# Patient Record
Sex: FEMALE | Race: BLACK OR AFRICAN AMERICAN | NOT HISPANIC OR LATINO | Employment: OTHER | URBAN - METROPOLITAN AREA
[De-identification: names, ages, dates, MRNs, and addresses within clinical notes are randomized per-mention and may not be internally consistent; named-entity substitution may affect disease eponyms.]

---

## 2021-01-14 ENCOUNTER — NEW REFERRAL (OUTPATIENT)
Dept: URBAN - METROPOLITAN AREA CLINIC 33 | Facility: CLINIC | Age: 66
End: 2021-01-14

## 2021-01-14 VITALS
SYSTOLIC BLOOD PRESSURE: 161 MMHG | BODY MASS INDEX: 29.71 KG/M2 | HEART RATE: 70 BPM | WEIGHT: 174 LBS | DIASTOLIC BLOOD PRESSURE: 85 MMHG | HEIGHT: 64 IN

## 2021-01-14 DIAGNOSIS — E11.3593: ICD-10-CM

## 2021-01-14 DIAGNOSIS — H25.13: ICD-10-CM

## 2021-01-14 DIAGNOSIS — H43.393: ICD-10-CM

## 2021-01-14 PROCEDURE — 99204 OFFICE O/P NEW MOD 45 MIN: CPT

## 2021-01-14 PROCEDURE — 92235 FLUORESCEIN ANGRPH MLTIFRAME: CPT

## 2021-01-14 PROCEDURE — 67028 INJECTION EYE DRUG: CPT

## 2021-01-14 PROCEDURE — 92250 FUNDUS PHOTOGRAPHY W/I&R: CPT

## 2021-01-14 PROCEDURE — 92134 CPTRZ OPH DX IMG PST SGM RTA: CPT

## 2021-01-14 ASSESSMENT — TONOMETRY
OD_IOP_MMHG: 14
OS_IOP_MMHG: 17

## 2021-01-14 ASSESSMENT — VISUAL ACUITY
OS_SC: 20/40-1
OD_SC: 20/30-1

## 2021-01-29 ENCOUNTER — CLINICAL PROCEDURE AND DIAGNOSTIC TESTING ONLY (OUTPATIENT)
Dept: URBAN - METROPOLITAN AREA CLINIC 26 | Facility: CLINIC | Age: 66
End: 2021-01-29

## 2021-01-29 DIAGNOSIS — E11.3593: ICD-10-CM

## 2021-01-29 PROCEDURE — 92250 FUNDUS PHOTOGRAPHY W/I&R: CPT

## 2021-01-29 PROCEDURE — 67028 INJECTION EYE DRUG: CPT

## 2021-01-29 ASSESSMENT — TONOMETRY
OS_IOP_MMHG: 14
OD_IOP_MMHG: 14

## 2021-01-29 ASSESSMENT — VISUAL ACUITY
OD_SC: 20/40+1
OS_SC: 20/25-2

## 2021-02-19 ENCOUNTER — CLINICAL PROCEDURE AND DIAGNOSTIC TESTING ONLY (OUTPATIENT)
Dept: URBAN - METROPOLITAN AREA CLINIC 26 | Facility: CLINIC | Age: 66
End: 2021-02-19

## 2021-02-19 DIAGNOSIS — E11.3593: ICD-10-CM

## 2021-02-19 PROCEDURE — 67028 INJECTION EYE DRUG: CPT

## 2021-02-19 PROCEDURE — 92250 FUNDUS PHOTOGRAPHY W/I&R: CPT

## 2021-02-19 ASSESSMENT — TONOMETRY
OD_IOP_MMHG: 17
OS_IOP_MMHG: 18

## 2021-02-19 ASSESSMENT — VISUAL ACUITY
OS_SC: 20/30-2
OD_SC: 20/40

## 2021-03-05 ENCOUNTER — CLINIC PROCEDURE ONLY (OUTPATIENT)
Dept: URBAN - METROPOLITAN AREA CLINIC 26 | Facility: CLINIC | Age: 66
End: 2021-03-05

## 2021-03-05 DIAGNOSIS — E11.3593: ICD-10-CM

## 2021-03-05 PROCEDURE — 67028 INJECTION EYE DRUG: CPT

## 2021-03-05 ASSESSMENT — VISUAL ACUITY
OD_SC: 20/40-2
OS_SC: 20/30-2

## 2021-03-05 ASSESSMENT — TONOMETRY
OS_IOP_MMHG: 15
OD_IOP_MMHG: 17

## 2021-04-02 ENCOUNTER — CLINICAL PROCEDURE AND DIAGNOSTIC TESTING ONLY (OUTPATIENT)
Dept: URBAN - METROPOLITAN AREA CLINIC 26 | Facility: CLINIC | Age: 66
End: 2021-04-02

## 2021-04-02 DIAGNOSIS — E11.3593: ICD-10-CM

## 2021-04-02 PROCEDURE — 67028 INJECTION EYE DRUG: CPT

## 2021-04-02 PROCEDURE — 92250 FUNDUS PHOTOGRAPHY W/I&R: CPT

## 2021-04-02 ASSESSMENT — VISUAL ACUITY
OS_SC: 20/25+2
OD_SC: 20/40+1

## 2021-04-02 ASSESSMENT — TONOMETRY
OD_IOP_MMHG: 11
OS_IOP_MMHG: 13

## 2021-04-16 ENCOUNTER — CLINICAL PROCEDURE AND DIAGNOSTIC TESTING ONLY (OUTPATIENT)
Dept: URBAN - METROPOLITAN AREA CLINIC 26 | Facility: CLINIC | Age: 66
End: 2021-04-16

## 2021-04-16 DIAGNOSIS — H43.393: ICD-10-CM

## 2021-04-16 DIAGNOSIS — H35.373: ICD-10-CM

## 2021-04-16 DIAGNOSIS — E11.3593: ICD-10-CM

## 2021-04-16 PROCEDURE — 67028 INJECTION EYE DRUG: CPT

## 2021-04-16 PROCEDURE — 92250 FUNDUS PHOTOGRAPHY W/I&R: CPT

## 2021-04-16 PROCEDURE — 92134 CPTRZ OPH DX IMG PST SGM RTA: CPT

## 2021-04-16 PROCEDURE — 92014 COMPRE OPH EXAM EST PT 1/>: CPT

## 2021-04-16 ASSESSMENT — VISUAL ACUITY
OS_SC: 20/20-1
OD_SC: 20/30-2

## 2021-04-16 ASSESSMENT — TONOMETRY
OS_IOP_MMHG: 15
OD_IOP_MMHG: 14

## 2021-06-16 ENCOUNTER — FOLLOW UP AND POST INJECTION EVALUATION (OUTPATIENT)
Dept: URBAN - METROPOLITAN AREA CLINIC 26 | Facility: CLINIC | Age: 66
End: 2021-06-16

## 2021-06-16 VITALS — DIASTOLIC BLOOD PRESSURE: 77 MMHG | HEIGHT: 55 IN | SYSTOLIC BLOOD PRESSURE: 110 MMHG | HEART RATE: 65 BPM

## 2021-06-16 DIAGNOSIS — E11.3593: ICD-10-CM

## 2021-06-16 DIAGNOSIS — H35.373: ICD-10-CM

## 2021-06-16 DIAGNOSIS — H25.13: ICD-10-CM

## 2021-06-16 DIAGNOSIS — H43.393: ICD-10-CM

## 2021-06-16 PROCEDURE — 67028 INJECTION EYE DRUG: CPT

## 2021-06-16 PROCEDURE — 92235 FLUORESCEIN ANGRPH MLTIFRAME: CPT

## 2021-06-16 PROCEDURE — 92250 FUNDUS PHOTOGRAPHY W/I&R: CPT

## 2021-06-16 PROCEDURE — 92134 CPTRZ OPH DX IMG PST SGM RTA: CPT

## 2021-06-16 PROCEDURE — 92014 COMPRE OPH EXAM EST PT 1/>: CPT

## 2021-06-16 ASSESSMENT — TONOMETRY
OD_IOP_MMHG: 13
OS_IOP_MMHG: 11

## 2021-06-16 ASSESSMENT — VISUAL ACUITY
OD_SC: 20/30-1
OS_SC: 20/20-1

## 2021-08-18 ENCOUNTER — FOLLOW UP AND POST INJECTION EVALUATION (OUTPATIENT)
Dept: URBAN - METROPOLITAN AREA CLINIC 26 | Facility: CLINIC | Age: 66
End: 2021-08-18

## 2021-08-18 DIAGNOSIS — H35.373: ICD-10-CM

## 2021-08-18 DIAGNOSIS — H04.123: ICD-10-CM

## 2021-08-18 DIAGNOSIS — H25.13: ICD-10-CM

## 2021-08-18 DIAGNOSIS — E11.3593: ICD-10-CM

## 2021-08-18 DIAGNOSIS — H43.393: ICD-10-CM

## 2021-08-18 PROCEDURE — 67028 INJECTION EYE DRUG: CPT

## 2021-08-18 PROCEDURE — 92250 FUNDUS PHOTOGRAPHY W/I&R: CPT

## 2021-08-18 PROCEDURE — 92134 CPTRZ OPH DX IMG PST SGM RTA: CPT

## 2021-08-18 PROCEDURE — 92014 COMPRE OPH EXAM EST PT 1/>: CPT

## 2021-08-18 ASSESSMENT — VISUAL ACUITY
OS_CC: 20/20
OD_CC: 20/30

## 2021-08-18 ASSESSMENT — TONOMETRY
OS_IOP_MMHG: 11
OD_IOP_MMHG: 10
OS_IOP_MMHG: 29

## 2021-10-20 ENCOUNTER — FOLLOW UP AND POST INJECTION EVALUATION (OUTPATIENT)
Dept: URBAN - METROPOLITAN AREA CLINIC 26 | Facility: CLINIC | Age: 66
End: 2021-10-20

## 2021-10-20 DIAGNOSIS — E11.3593: ICD-10-CM

## 2021-10-20 DIAGNOSIS — H43.393: ICD-10-CM

## 2021-10-20 DIAGNOSIS — H04.123: ICD-10-CM

## 2021-10-20 DIAGNOSIS — H35.373: ICD-10-CM

## 2021-10-20 PROCEDURE — 67028 INJECTION EYE DRUG: CPT

## 2021-10-20 PROCEDURE — 92134 CPTRZ OPH DX IMG PST SGM RTA: CPT

## 2021-10-20 PROCEDURE — 92012 INTRM OPH EXAM EST PATIENT: CPT

## 2021-10-20 PROCEDURE — 92250 FUNDUS PHOTOGRAPHY W/I&R: CPT

## 2021-10-20 ASSESSMENT — VISUAL ACUITY
OS_CC: 20/30+2
OD_CC: 20/50+2

## 2021-10-20 ASSESSMENT — TONOMETRY
OS_IOP_MMHG: 16
OD_IOP_MMHG: 14

## 2021-12-20 ENCOUNTER — CLINIC PROCEDURE ONLY (OUTPATIENT)
Dept: URBAN - METROPOLITAN AREA CLINIC 26 | Facility: CLINIC | Age: 66
End: 2021-12-20

## 2021-12-20 DIAGNOSIS — H35.373: ICD-10-CM

## 2021-12-20 DIAGNOSIS — E11.3593: ICD-10-CM

## 2021-12-20 PROCEDURE — 92250 FUNDUS PHOTOGRAPHY W/I&R: CPT

## 2021-12-20 PROCEDURE — 92134 CPTRZ OPH DX IMG PST SGM RTA: CPT

## 2021-12-20 PROCEDURE — 67028 INJECTION EYE DRUG: CPT

## 2021-12-20 ASSESSMENT — TONOMETRY
OD_IOP_MMHG: 13
OS_IOP_MMHG: 15

## 2021-12-20 ASSESSMENT — VISUAL ACUITY
OD_PH: 20/25-2
OD_SC: 20/50-2
OS_SC: 20/30
OS_PH: 20/20-2

## 2021-12-21 ENCOUNTER — OFFICE VISIT (OUTPATIENT)
Dept: URBAN - METROPOLITAN AREA CLINIC 63 | Facility: CLINIC | Age: 66
End: 2021-12-21

## 2022-01-12 NOTE — PATIENT DISCUSSION
SIGNIFICANT BROW PTOSIS, OU; RECOMMEND DIRECT BROW, OU.  I have examined the patient and reviewed the photos and visual fields.  The brow position has fallen below the orbital rim.  This causes impairment of the peripheral visual field which improves with taping.  I have discussed with the patient the option of a brow lift to elevate the brow to a more normal anatomic position and improve the functional visual field loss.  We have discussed the risks and benefits of the surgery at length as well as the location of the incision and the recovery process.  The patient understands the surgery , has had all questions addressed and desires to proceed with the surgery as explained.

## 2022-01-12 NOTE — PATIENT DISCUSSION
RECOMMEND COSMETIC LOWER EYELID BLEPHAROPLASTY W/ CO2 LASER RESURFACING. The patient is considering repair of their lower eyelid dermatochalasis.  Discussed risks and benefits of a transconjunctival lower eyelid blepharoplasty with laser resurfacing. The patient is aware of risk of prolonged redness from laser resurfacing, which may last up to 6 months in normal cases.  The patient was referred to the website for further information. The patient understands that this portion is cosmetic and will schedule surgery at their convenience.

## 2022-03-16 ENCOUNTER — FOLLOW UP (OUTPATIENT)
Dept: URBAN - METROPOLITAN AREA CLINIC 26 | Facility: CLINIC | Age: 67
End: 2022-03-16

## 2022-03-16 VITALS
HEIGHT: 63 IN | SYSTOLIC BLOOD PRESSURE: 199 MMHG | HEART RATE: 63 BPM | DIASTOLIC BLOOD PRESSURE: 91 MMHG | WEIGHT: 193 LBS | BODY MASS INDEX: 34.2 KG/M2

## 2022-03-16 DIAGNOSIS — E11.3511: ICD-10-CM

## 2022-03-16 DIAGNOSIS — H35.373: ICD-10-CM

## 2022-03-16 DIAGNOSIS — H43.393: ICD-10-CM

## 2022-03-16 DIAGNOSIS — E11.3592: ICD-10-CM

## 2022-03-16 DIAGNOSIS — H04.123: ICD-10-CM

## 2022-03-16 PROCEDURE — 92012 INTRM OPH EXAM EST PATIENT: CPT

## 2022-03-16 PROCEDURE — 92134 CPTRZ OPH DX IMG PST SGM RTA: CPT

## 2022-03-16 ASSESSMENT — TONOMETRY
OD_IOP_MMHG: 15
OS_IOP_MMHG: 13

## 2022-03-16 ASSESSMENT — VISUAL ACUITY
OS_SC: 20/25
OD_SC: 20/40-2
OD_PH: 20/30

## 2022-05-03 ENCOUNTER — FOLLOW UP (OUTPATIENT)
Dept: URBAN - METROPOLITAN AREA CLINIC 26 | Facility: CLINIC | Age: 67
End: 2022-05-03

## 2022-05-03 VITALS
HEART RATE: 61 BPM | BODY MASS INDEX: 32.6 KG/M2 | DIASTOLIC BLOOD PRESSURE: 94 MMHG | WEIGHT: 184 LBS | SYSTOLIC BLOOD PRESSURE: 209 MMHG | HEIGHT: 63 IN

## 2022-05-03 DIAGNOSIS — H35.373: ICD-10-CM

## 2022-05-03 DIAGNOSIS — E11.3592: ICD-10-CM

## 2022-05-03 DIAGNOSIS — H04.123: ICD-10-CM

## 2022-05-03 DIAGNOSIS — E11.3511: ICD-10-CM

## 2022-05-03 DIAGNOSIS — H43.393: ICD-10-CM

## 2022-05-03 PROCEDURE — 92134 CPTRZ OPH DX IMG PST SGM RTA: CPT

## 2022-05-03 PROCEDURE — 92012 INTRM OPH EXAM EST PATIENT: CPT

## 2022-05-03 PROCEDURE — 92235 FLUORESCEIN ANGRPH MLTIFRAME: CPT

## 2022-05-03 ASSESSMENT — VISUAL ACUITY
OD_SC: 20/30-1
OS_SC: 20/25-1

## 2022-05-03 ASSESSMENT — TONOMETRY
OS_IOP_MMHG: 19
OD_IOP_MMHG: 17

## 2022-07-09 ENCOUNTER — TELEPHONE ENCOUNTER (OUTPATIENT)
Dept: URBAN - METROPOLITAN AREA CLINIC 121 | Facility: CLINIC | Age: 67
End: 2022-07-09

## 2022-07-10 ENCOUNTER — TELEPHONE ENCOUNTER (OUTPATIENT)
Dept: URBAN - METROPOLITAN AREA CLINIC 121 | Facility: CLINIC | Age: 67
End: 2022-07-10

## 2022-08-10 ENCOUNTER — FOLLOW UP (OUTPATIENT)
Dept: URBAN - METROPOLITAN AREA CLINIC 26 | Facility: CLINIC | Age: 67
End: 2022-08-10

## 2022-08-10 DIAGNOSIS — H35.373: ICD-10-CM

## 2022-08-10 DIAGNOSIS — E11.3511: ICD-10-CM

## 2022-08-10 DIAGNOSIS — H43.393: ICD-10-CM

## 2022-08-10 DIAGNOSIS — E11.3592: ICD-10-CM

## 2022-08-10 DIAGNOSIS — H04.123: ICD-10-CM

## 2022-08-10 PROCEDURE — 92014 COMPRE OPH EXAM EST PT 1/>: CPT

## 2022-08-10 PROCEDURE — 92134 CPTRZ OPH DX IMG PST SGM RTA: CPT

## 2022-08-10 PROCEDURE — 92250 FUNDUS PHOTOGRAPHY W/I&R: CPT

## 2022-08-10 ASSESSMENT — TONOMETRY
OD_IOP_MMHG: 21
OS_IOP_MMHG: 21

## 2022-08-10 ASSESSMENT — VISUAL ACUITY
OS_SC: 20/30-1
OD_SC: 20/50+2
OD_PH: 20/30-1

## 2023-06-15 ENCOUNTER — FOLLOW UP (OUTPATIENT)
Dept: URBAN - METROPOLITAN AREA CLINIC 26 | Facility: CLINIC | Age: 68
End: 2023-06-15

## 2023-06-15 VITALS
SYSTOLIC BLOOD PRESSURE: 137 MMHG | HEART RATE: 73 BPM | BODY MASS INDEX: 33.77 KG/M2 | WEIGHT: 193 LBS | HEIGHT: 63.5 IN | DIASTOLIC BLOOD PRESSURE: 76 MMHG

## 2023-06-15 DIAGNOSIS — H43.393: ICD-10-CM

## 2023-06-15 DIAGNOSIS — H04.123: ICD-10-CM

## 2023-06-15 DIAGNOSIS — E11.3592: ICD-10-CM

## 2023-06-15 DIAGNOSIS — H35.373: ICD-10-CM

## 2023-06-15 DIAGNOSIS — E11.3511: ICD-10-CM

## 2023-06-15 PROCEDURE — 92134 CPTRZ OPH DX IMG PST SGM RTA: CPT

## 2023-06-15 PROCEDURE — 92250 FUNDUS PHOTOGRAPHY W/I&R: CPT

## 2023-06-15 PROCEDURE — 92235 FLUORESCEIN ANGRPH MLTIFRAME: CPT

## 2023-06-15 PROCEDURE — 92014 COMPRE OPH EXAM EST PT 1/>: CPT

## 2023-06-15 ASSESSMENT — VISUAL ACUITY
OS_SC: 20/25-1
OD_PH: 20/40-2
OD_SC: 20/50

## 2023-06-15 ASSESSMENT — TONOMETRY
OS_IOP_MMHG: 14
OD_IOP_MMHG: 11

## 2023-08-09 ENCOUNTER — FOLLOW UP (OUTPATIENT)
Dept: URBAN - METROPOLITAN AREA CLINIC 26 | Facility: CLINIC | Age: 68
End: 2023-08-09

## 2023-08-09 DIAGNOSIS — H43.393: ICD-10-CM

## 2023-08-09 DIAGNOSIS — H15.122: ICD-10-CM

## 2023-08-09 DIAGNOSIS — E11.3592: ICD-10-CM

## 2023-08-09 DIAGNOSIS — H04.123: ICD-10-CM

## 2023-08-09 DIAGNOSIS — E11.3511: ICD-10-CM

## 2023-08-09 DIAGNOSIS — H35.373: ICD-10-CM

## 2023-08-09 PROCEDURE — 92014 COMPRE OPH EXAM EST PT 1/>: CPT

## 2023-08-09 RX ORDER — IBUPROFEN 600 MG/1: 1 TABLET, FILM COATED ORAL

## 2023-08-09 ASSESSMENT — VISUAL ACUITY
OD_PH: 20/25-1
OS_SC: 20/30+1
OD_SC: 20/40-2

## 2023-08-09 ASSESSMENT — TONOMETRY
OD_IOP_MMHG: 15
OS_IOP_MMHG: 22

## 2023-09-05 ENCOUNTER — FOLLOW UP (OUTPATIENT)
Dept: URBAN - METROPOLITAN AREA CLINIC 26 | Facility: CLINIC | Age: 68
End: 2023-09-05

## 2023-09-05 DIAGNOSIS — H04.123: ICD-10-CM

## 2023-09-05 DIAGNOSIS — H35.373: ICD-10-CM

## 2023-09-05 DIAGNOSIS — E11.3592: ICD-10-CM

## 2023-09-05 DIAGNOSIS — H15.122: ICD-10-CM

## 2023-09-05 DIAGNOSIS — E11.3511: ICD-10-CM

## 2023-09-05 DIAGNOSIS — H43.393: ICD-10-CM

## 2023-09-05 PROCEDURE — 92134 CPTRZ OPH DX IMG PST SGM RTA: CPT

## 2023-09-05 PROCEDURE — 92250 FUNDUS PHOTOGRAPHY W/I&R: CPT

## 2023-09-05 PROCEDURE — 99213 OFFICE O/P EST LOW 20 MIN: CPT

## 2023-09-05 ASSESSMENT — TONOMETRY
OD_IOP_MMHG: 15
OS_IOP_MMHG: 13

## 2023-09-05 ASSESSMENT — VISUAL ACUITY
OD_SC: 20/40-2
OS_SC: 20/25+1